# Patient Record
Sex: MALE | Race: WHITE | Employment: OTHER | URBAN - METROPOLITAN AREA
[De-identification: names, ages, dates, MRNs, and addresses within clinical notes are randomized per-mention and may not be internally consistent; named-entity substitution may affect disease eponyms.]

---

## 2020-10-19 ENCOUNTER — OFFICE VISIT (OUTPATIENT)
Dept: ORTHOPEDICS CLINIC | Facility: CLINIC | Age: 55
End: 2020-10-19
Payer: COMMERCIAL

## 2020-10-19 DIAGNOSIS — M17.11 PRIMARY OSTEOARTHRITIS OF RIGHT KNEE: Primary | ICD-10-CM

## 2020-10-19 PROCEDURE — 99213 OFFICE O/P EST LOW 20 MIN: CPT | Performed by: ORTHOPAEDIC SURGERY

## 2020-10-19 NOTE — PROGRESS NOTES
[facility] Follow-up Progress Note      Chief Complaint: Right knee pain      History: The patient is a 54-year-old male who presents for assessment of his right knee.   He underwent arthroscopic treatment of this right knee in 2019 after which

## 2020-12-07 ENCOUNTER — OFFICE VISIT (OUTPATIENT)
Dept: ORTHOPEDICS CLINIC | Facility: CLINIC | Age: 55
End: 2020-12-07
Payer: COMMERCIAL

## 2020-12-07 ENCOUNTER — TELEPHONE (OUTPATIENT)
Dept: ORTHOPEDICS CLINIC | Facility: CLINIC | Age: 55
End: 2020-12-07

## 2020-12-07 DIAGNOSIS — M17.11 PRIMARY OSTEOARTHRITIS OF RIGHT KNEE: Primary | ICD-10-CM

## 2020-12-07 PROCEDURE — 20610 DRAIN/INJ JOINT/BURSA W/O US: CPT | Performed by: PHYSICIAN ASSISTANT

## 2020-12-07 NOTE — TELEPHONE ENCOUNTER
Functional Capacity Questionnaire received in office for completion. Collect $25 form fee from patient at time of .

## 2020-12-07 NOTE — PROGRESS NOTES
[facility] Progress Note      Chief Complaint:  Right knee pain      Subjective: Chu Payan is a 54year old male who is here for administration of right knee Monovisc injection. He had a gel injection about a year ago with good relief.        Obj

## 2021-02-18 ENCOUNTER — TELEPHONE (OUTPATIENT)
Dept: ORTHOPEDICS CLINIC | Facility: CLINIC | Age: 56
End: 2021-02-18

## 2021-02-18 DIAGNOSIS — M25.562 LEFT KNEE PAIN, UNSPECIFIED CHRONICITY: Primary | ICD-10-CM

## 2021-02-18 NOTE — TELEPHONE ENCOUNTER
Patient called regarding Handicap Renewal . Patient needs a new form completed since his handicap is about to . Patient will come  the form once it's completed. Please contact patient once form is completed.

## 2021-02-18 NOTE — TELEPHONE ENCOUNTER
Called patient to let him know that Dr. Marco Chavira will renew his placard for 3 months and that if he isn't better from Monovisc injection then he should come in and be seen

## 2021-02-23 ENCOUNTER — HOSPITAL ENCOUNTER (OUTPATIENT)
Dept: GENERAL RADIOLOGY | Age: 56
Discharge: HOME | End: 2021-02-23
Attending: ORTHOPAEDIC SURGERY
Payer: COMMERCIAL

## 2021-02-23 DIAGNOSIS — M25.562 LEFT KNEE PAIN, UNSPECIFIED CHRONICITY: ICD-10-CM

## 2021-02-23 PROCEDURE — 73564 X-RAY EXAM KNEE 4 OR MORE: CPT | Performed by: ORTHOPAEDIC SURGERY

## 2021-03-01 ENCOUNTER — OFFICE VISIT (OUTPATIENT)
Dept: ORTHOPEDICS CLINIC | Facility: CLINIC | Age: 56
End: 2021-03-01
Payer: COMMERCIAL

## 2021-03-01 DIAGNOSIS — M17.12 PRIMARY OSTEOARTHRITIS OF LEFT KNEE: Primary | ICD-10-CM

## 2021-03-01 PROCEDURE — 99213 OFFICE O/P EST LOW 20 MIN: CPT | Performed by: ORTHOPAEDIC SURGERY

## 2021-03-01 NOTE — PROGRESS NOTES
Chief Complaint: Left knee pain      History: Mr. Triston Perry is a 27-year-old male presenting for assessment of his left knee. He has a history of arthritis in his knees affecting the right more so than the left.   More recently he had a fairly significant flar [facility] 56[facility]    The dictation was partially prepared using [employer] Pending sale to [facility] voice recognition software.   Although every attempt is made to correct errors where identified, discrepancies may still exist.

## 2021-04-15 ENCOUNTER — TELEPHONE (OUTPATIENT)
Dept: ORTHOPEDICS CLINIC | Facility: CLINIC | Age: 56
End: 2021-04-15

## 2021-04-15 NOTE — TELEPHONE ENCOUNTER
Called and lmom for patient regarding his approval for monovisc. I also wanted to go ahead and set up his appointment for the procedure.

## 2021-04-19 ENCOUNTER — TELEPHONE (OUTPATIENT)
Dept: ORTHOPEDICS CLINIC | Facility: CLINIC | Age: 56
End: 2021-04-19

## 2021-04-19 NOTE — TELEPHONE ENCOUNTER
Called Jair Gee on behalf of Mark Michelle but I did end up speaking with Mark Michelle as well, I informed him that the the monovisc was approved through his insurance and that I wanted to go ahead and schedule his appointment for his injection.  Mark Michelle informed me that he w